# Patient Record
Sex: MALE | Race: WHITE | NOT HISPANIC OR LATINO | Employment: FULL TIME | URBAN - METROPOLITAN AREA
[De-identification: names, ages, dates, MRNs, and addresses within clinical notes are randomized per-mention and may not be internally consistent; named-entity substitution may affect disease eponyms.]

---

## 2022-01-24 ENCOUNTER — HOSPITAL ENCOUNTER (EMERGENCY)
Facility: HOSPITAL | Age: 37
Discharge: HOME | End: 2022-01-24
Admitting: EMERGENCY MEDICINE
Payer: COMMERCIAL

## 2022-01-24 VITALS
RESPIRATION RATE: 18 BRPM | DIASTOLIC BLOOD PRESSURE: 69 MMHG | WEIGHT: 225 LBS | BODY MASS INDEX: 27.98 KG/M2 | TEMPERATURE: 97.9 F | OXYGEN SATURATION: 99 % | HEART RATE: 67 BPM | SYSTOLIC BLOOD PRESSURE: 125 MMHG | HEIGHT: 75 IN

## 2022-01-24 DIAGNOSIS — S61.208A AVULSION OF SKIN OF INDEX FINGER, INITIAL ENCOUNTER: Primary | ICD-10-CM

## 2022-01-24 PROCEDURE — 99282 EMERGENCY DEPT VISIT SF MDM: CPT

## 2022-01-24 PROCEDURE — 99282 EMERGENCY DEPT VISIT SF MDM: CPT | Performed by: PHYSICIAN ASSISTANT

## 2022-01-24 NOTE — ED PROVIDER NOTES
History  Chief Complaint   Patient presents with    Finger Laceration     sliced left pointer finger with apple slicer  29 y o  male presents to the ED with chief complaint of finger laceration  Onset reported as just prior to arrival  Location is reported as left index finger  Quality is reported as laceration  Severity is reported as moderate  Associated symptoms:  Denies wrist, elbow or shoulder pain  Denies paralysis, paraesthesias or weakness to hand or upper extremity  Modifiers:  Local pressure applied to area has helped control bleeding  Hand dominance:  right  Context:  Patient reports he accidentally cut the tip of his left index finger with apple slicer at home just prior to arrival   Right hand dominant  Tetanus up to date  Denies other injuries  Works for [employer]  Medical summary - past visit history reviewed via EPIC demonstrates no prior visits to this ed  History provided by:  Patient   used: No    Finger Laceration  Associated symptoms: no fever and no rash        None       History reviewed  No pertinent past medical history  History reviewed  No pertinent surgical history  History reviewed  No pertinent family history  I have reviewed and agree with the history as documented  E-Cigarette/Vaping     E-Cigarette/Vaping Substances     Social History     Tobacco Use    Smoking status: Current Some Day Smoker    Smokeless tobacco: Not on file   Substance Use Topics    Alcohol use: Not on file    Drug use: Not on file       Review of Systems   Constitutional: Negative for fever  Musculoskeletal: Negative for arthralgias, back pain, gait problem, joint swelling, myalgias, neck pain and neck stiffness  Skin: Positive for wound  Negative for color change, pallor and rash  Neurological: Negative for dizziness, tremors, weakness, light-headedness and numbness  All other systems reviewed and are negative        Physical Exam  Physical Exam  Vitals and nursing note reviewed  Constitutional:       General: He is not in acute distress  Appearance: Normal appearance  He is not ill-appearing  Comments: /69 (BP Location: Right arm)   Pulse 67   Temp 97 9 °F (36 6 °C) (Oral)   Resp 18   Ht 6' 3" (1 905 m)   Wt 102 kg (225 lb)   SpO2 99%   BMI 28 12 kg/m²      HENT:      Head: Normocephalic and atraumatic  Right Ear: External ear normal       Left Ear: External ear normal       Nose: Nose normal    Eyes:      General: No scleral icterus  Right eye: No discharge  Left eye: No discharge  Extraocular Movements: Extraocular movements intact  Conjunctiva/sclera: Conjunctivae normal    Cardiovascular:      Rate and Rhythm: Normal rate  Pulses: Normal pulses  Pulmonary:      Effort: Pulmonary effort is normal  No respiratory distress  Musculoskeletal:         General: Tenderness and signs of injury present  No swelling or deformity  Normal range of motion  Cervical back: Normal range of motion and neck supple  No rigidity or tenderness  Comments: Left index finger:  No gross deformity,  Distal neurovascular intact to left index finger  Flex/ext tendon function fully intact  Strength 5/5 normal   FROM at left index MCPJ, PIPJ and DIPJ  There is superficial skin avulsion present to distal tip of index finger with venous bleeding present  Superficial skin avulsion - explored to depth, no FB, no injury to underlying structures  Fingernail intact without avulsion or subungual hematoma  Remainder of hand is normal to inspection, non tender to palpation with FROM  Skin:     Capillary Refill: Capillary refill takes less than 2 seconds  Coloration: Skin is not jaundiced or pale  Findings: No erythema or rash  Neurological:      General: No focal deficit present  Mental Status: He is alert and oriented to person, place, and time  Mental status is at baseline  Cranial Nerves:  No cranial nerve deficit  Sensory: No sensory deficit  Motor: No weakness  Gait: Gait normal    Psychiatric:         Mood and Affect: Mood normal          Behavior: Behavior normal          Thought Content: Thought content normal          Judgment: Judgment normal          Vital Signs  ED Triage Vitals [01/24/22 0908]   Temperature Pulse Respirations Blood Pressure SpO2   97 9 °F (36 6 °C) 67 18 125/69 99 %      Temp Source Heart Rate Source Patient Position - Orthostatic VS BP Location FiO2 (%)   Oral Monitor -- Right arm --      Pain Score       5           Vitals:    01/24/22 0908   BP: 125/69   Pulse: 67         Visual Acuity      ED Medications  Medications - No data to display    Diagnostic Studies  Results Reviewed     None                 No orders to display              Procedures  Laceration repair    Date/Time: 1/24/2022 9:20 AM  Performed by: Jodie Haddad PA-C  Authorized by: Jodie Haddad PA-C   Consent: Verbal consent obtained  Risks and benefits: risks, benefits and alternatives were discussed  Consent given by: patient  Patient identity confirmed: verbally with patient  Body area: upper extremity  Location details: left index finger  Laceration length: 1 cm  Foreign bodies: no foreign bodies  Tendon involvement: none  Nerve involvement: none  Vascular damage: no  Anesthesia: see MAR for details    Sedation:  Patient sedated: no      Wound Dehiscence:  Superficial Wound Dehiscence: simple closure      Procedure Details:  Irrigation solution: saline  Irrigation method: tap  Amount of cleaning: standard  Debridement: none  Degree of undermining: none  Approximation difficulty: simple  Dressing: tube gauze (gelfoam dressing)  Patient tolerance: patient tolerated the procedure well with no immediate complications  Comments: Patient with gelfoam dressing to left index finger covered with tube gauze  Bleeding controlled  Wound care/dressing application performed by me  ED Course                               SBIRT 22yo+      Most Recent Value   SBIRT (24 yo +)    In order to provide better care to our patients, we are screening all of our patients for alcohol and drug use  Would it be okay to ask you these screening questions? Yes Filed at: 01/24/2022 9572   Initial Alcohol Screen: US AUDIT-C     1  How often do you have a drink containing alcohol? 0 Filed at: 01/24/2022 0919   2  How many drinks containing alcohol do you have on a typical day you are drinking? 0 Filed at: 01/24/2022 0919   3a  Male UNDER 65: How often do you have five or more drinks on one occasion? 0 Filed at: 01/24/2022 0919   Audit-C Score 0 Filed at: 01/24/2022 1859   OFELIA: How many times in the past year have you    Used an illegal drug or used a prescription medication for non-medical reasons? Never Filed at: 01/24/2022 6762                    MDM  Number of Diagnoses or Management Options  Avulsion of skin of index finger, initial encounter: new and does not require workup  Diagnosis management comments: ddx includes but is not limited to :  Skin avulsion, finger laceration, infection, nail injury, injury to underlying vascular, ligamentous, tendinous or orthopedic structures  Amount and/or Complexity of Data Reviewed  Review and summarize past medical records: yes    Risk of Complications, Morbidity, and/or Mortality  General comments: ED summary:  45-year-old male accidentally cut the distal tip of his left index finger with an apple states root home prior to arrival   Patient is right-hand dominant  He works for the [place] [id number] [email or web address]   He denies other injuries  No neurological deficits on clinical exam   Patient is neurovascularly intact to the left index finger  There is no finger nail injury  There is a superficial skin avulsion noted to the distal tip of the finger  Bleeding is controlled with local pressure    Gelfoam dressing was applied and covered with tube gauze with control of bleeding and treatment of skin avulsion  Discussed diagnosis and expected course of skin avulsion  Discussed treatment plan including change in no powder dressing daily for the next 2 weeks while leaving Gelfoam dressing in place  No clinical signs of infection  No indication for antibiotics  Tetanus is up-to-date  Wound is to superficial to suture hence no sutures are indicated  Wound is too superficial for injury to underlying structures  Instructed patient regarding follow up with primary care physician hand specialist in 3-5 days recheck and further evaluation of symptoms       Patient was seen during the outbreak of the corona virus epidemic   Resources are limited due to the severity of patient illnesses associated with virus   Testing is also limited at this time   Discussed with patient at the time of this evaluation   Due to the fact that limited resources are available -treatment options are limited  Patient Progress  Patient progress: stable      Disposition  Final diagnoses:   Avulsion of skin of index finger, initial encounter     Time reflects when diagnosis was documented in both MDM as applicable and the Disposition within this note     Time User Action Codes Description Comment    1/24/2022  9:31 AM Nyla Reasons Add [V61 [id number]] Avulsion of skin of index finger, initial encounter       ED Disposition     ED Disposition Condition Date/Time Comment    Discharge Stable Mon Jan 24, 2022  9:31 AM Malcom Cho discharge to home/self care              Follow-up Information     Follow up With Specialties Details Why Contact Info Additional [address] [facility] Emergency Department Emergency Medicine Go to  If symptoms worsen [address] [facility] [id number] [facility] Emergency Department, [address] [place], [place], [address] Paulina, MD Family Medicine Call in 2 days for further evaluation of symptoms [address]   [address]  [address]       Elias Chavez MD Orthopedic Surgery, Hand Surgery Call  follow up as needed [address]  [phone]             There are no discharge medications for this patient  No discharge procedures on file      PDMP Review     None          ED Provider  Electronically Signed by           Josue Wong PA-C  01/26/22 9640

## 2022-01-24 NOTE — Clinical Note
Maria Fernanda Jorgensen was seen and treated in our emergency department on 1/24/2022  light duty, no lifting greater than 5lbs, limited use of left hand until 1/31/22    Diagnosis:     Toma Arzola  may return to work on return date  He may return on this date: 01/26/2022         If you have any questions or concerns, please don't hesitate to call        Christi Laurent PA-C    ______________________________           _______________          _______________  Hospital Representative                              Date                                Time

## 2022-01-27 ENCOUNTER — HOSPITAL ENCOUNTER (EMERGENCY)
Facility: HOSPITAL | Age: 37
Discharge: HOME | End: 2022-01-27
Attending: EMERGENCY MEDICINE | Admitting: EMERGENCY MEDICINE
Payer: COMMERCIAL

## 2022-01-27 ENCOUNTER — APPOINTMENT (EMERGENCY)
Dept: RADIOLOGY | Facility: HOSPITAL | Age: 37
End: 2022-01-27
Payer: COMMERCIAL

## 2022-01-27 VITALS
HEART RATE: 80 BPM | BODY MASS INDEX: 28.12 KG/M2 | OXYGEN SATURATION: 98 % | DIASTOLIC BLOOD PRESSURE: 65 MMHG | RESPIRATION RATE: 18 BRPM | WEIGHT: 225 LBS | TEMPERATURE: 97.7 F | SYSTOLIC BLOOD PRESSURE: 143 MMHG

## 2022-01-27 DIAGNOSIS — M53.3 COCCYX PAIN: ICD-10-CM

## 2022-01-27 DIAGNOSIS — S93.409A ANKLE SPRAIN: Primary | ICD-10-CM

## 2022-01-27 PROCEDURE — 99284 EMERGENCY DEPT VISIT MOD MDM: CPT | Performed by: EMERGENCY MEDICINE

## 2022-01-27 PROCEDURE — 99283 EMERGENCY DEPT VISIT LOW MDM: CPT

## 2022-01-27 PROCEDURE — 73610 X-RAY EXAM OF ANKLE: CPT

## 2022-01-27 RX ORDER — IBUPROFEN 600 MG/1
600 TABLET ORAL ONCE
Status: COMPLETED | OUTPATIENT
Start: 2022-01-27 | End: 2022-01-27

## 2022-01-27 RX ORDER — ACETAMINOPHEN 325 MG/1
650 TABLET ORAL ONCE
Status: DISCONTINUED | OUTPATIENT
Start: 2022-01-27 | End: 2022-01-27 | Stop reason: HOSPADM

## 2022-01-27 RX ADMIN — IBUPROFEN 600 MG: 600 TABLET ORAL at 12:15

## 2022-01-27 NOTE — Clinical Note
Joanna Bird was seen and treated in our emergency department on 1/27/2022  Diagnosis:     Xavier Victoria  may return to work on return date  He may return on this date: 02/01/2022         If you have any questions or concerns, please don't hesitate to call        Estephanie Figueroa,     ______________________________           _______________          _______________  Hospital Representative                              Date                                Time

## 2022-01-27 NOTE — ED NOTES
Pt is discharged to home at this time  VSS  AVS given and pt expressed understanding  Pt given Boot and educated on use         Kelsey Bishop, JOSEPH  01/27/22 3566

## 2022-01-27 NOTE — ED PROVIDER NOTES
History  Chief Complaint   Patient presents with    Ankle Injury     R ankle pain after after trying to stop himself while sledding down a hill, also reports tailbone pain     Tailbone Pain     Patient is a 60-year-old male no past medical history presenting with right ankle and tailbone injury  Patient states that he was sledding yesterday when he felt that he was picking up to much [place] as approaching a ramp in his yd when he stuck his right foot out to low himself down and states that when he hit the rim he hit his but on the ramp  Notes intermittent pain which he cannot describe to the tailbone which is nonradiating and worse with movement, worse with sitting and not resolved with Tylenol which he took yesterday  Also notes throbbing pain to the lateral aspect of the right ankle which radiates up his leg and is worse with walking or weight-bearing also not resolved with Tylenol yesterday  Denies any numbness or tingling, head trauma, LOC  None       History reviewed  No pertinent past medical history  History reviewed  No pertinent surgical history  History reviewed  No pertinent family history  I have reviewed and agree with the history as documented  E-Cigarette/Vaping     E-Cigarette/Vaping Substances     Social History     Tobacco Use    Smoking status: Current Some Day Smoker    Smokeless tobacco: Never Used   Substance Use Topics    Alcohol use: Not Currently    Drug use: Not Currently       Review of Systems   All other systems reviewed and are negative  Physical Exam  Physical Exam  Vitals reviewed  Constitutional:       General: He is not in acute distress  Appearance: Normal appearance  He is not ill-appearing  HENT:      Mouth/Throat:      Mouth: Mucous membranes are moist    Eyes:      Conjunctiva/sclera: Conjunctivae normal    Cardiovascular:      Rate and Rhythm: Normal rate     Pulmonary:      Effort: Pulmonary effort is normal    Abdominal:      General: Abdomen is flat  Musculoskeletal:         General: Tenderness present  No swelling  Normal range of motion  Cervical back: Neck supple  Comments: Tenderness proximal to the lateral malleolus, mild medial malleolar tenderness, no tenderness to the navicular   Skin:     General: Skin is warm and dry  Capillary Refill: Capillary refill takes less than 2 seconds  Neurological:      General: No focal deficit present  Mental Status: He is alert  Sensory: No sensory deficit  Motor: No weakness  Psychiatric:         Mood and Affect: Mood normal          Vital Signs  ED Triage Vitals [01/27/22 1114]   Temperature Pulse Respirations Blood Pressure SpO2   97 7 °F (36 5 °C) 80 18 143/65 98 %      Temp Source Heart Rate Source Patient Position - Orthostatic VS BP Location FiO2 (%)   Temporal Monitor Sitting Left arm --      Pain Score       --           Vitals:    01/27/22 1114   BP: 143/65   Pulse: 80   Patient Position - Orthostatic VS: Sitting         Visual Acuity      ED Medications  Medications   acetaminophen (TYLENOL) tablet 650 mg (650 mg Oral Not Given 1/27/22 1215)   ibuprofen (MOTRIN) tablet 600 mg (600 mg Oral Given 1/27/22 1215)       Diagnostic Studies  Results Reviewed     None                 XR ankle 3+ views RIGHT   Final Result by Elizabeth Krishnamurthy MD (01/27 1230)      No acute osseous abnormality  Workstation performed: [id number]                    Procedures  Procedures         ED Course                               SBIRT 22yo+      Most Recent Value   SBIRT (22 yo +)    In order to provide better care to our patients, we are screening all of our patients for alcohol and drug use  Would it be okay to ask you these screening questions? Yes Filed at: 01/27/2022 1117   Initial Alcohol Screen: US AUDIT-C     1  How often do you have a drink containing alcohol? 0 Filed at: 01/27/2022 1117   2   How many drinks containing alcohol do you have on a typical day you are drinking? 0 Filed at: 01/27/2022 1117   3a  Male UNDER 65: How often do you have five or more drinks on one occasion? 0 Filed at: 01/27/2022 1117   3b  FEMALE Any Age, or MALE 65+: How often do you have 4 or more drinks on one occassion? 0 Filed at: 01/27/2022 1117   Audit-C Score 0 Filed at: 01/27/2022 1117   OFELIA: How many times in the past year have you    Used an illegal drug or used a prescription medication for non-medical reasons? Never Filed at: 01/27/2022 1117                    [facility]  Number of Diagnoses or Management Options  Ankle sprain  Coccyx pain  Diagnosis management comments: Patient is a 50-year-old male no past medical history presenting with ankle injury, tailbone injury  Patient is well-appearing bedside stable vitals and in no acute distress  He is neurovascularly intact and ambulatory with mildly antalgic gait  He has no significant edema but does have tenderness to the lateral malleolus as well as proximally into the medial malleolus  X-ray unremarkable  Will place in boot with crutches and outpatient Orthopedic and Podiatry follow-up  Have discussed with patient that sacral x-ray is likely not necessary as it does not , pain control, below an orthopedic follow-up  Patient states that he is comfortable with outpatient follow-up for this injury  Disposition  Final diagnoses: Ankle sprain   Coccyx pain     Time reflects when diagnosis was documented in both MDM as applicable and the Disposition within this note     Time User Action Codes Description Comment    1/27/2022 12:01 PM Sofia Tapia Add [S48 774K] Ankle sprain     1/27/2022 12:01 PM Sofia Duong [M53 3] Coccyx pain       ED Disposition     ED Disposition Condition Date/Time Comment    Discharge Stable u Jan 27, 2022 12:01 PM Tameka Nurse discharge to home/self care              Follow-up Information     Follow up With Specialties Details Why Contact Info Additional Information    [facility] Orthopedic Surgery Schedule an appointment as soon as possible for a visit   [address] [facility]  [address] [phone] [address] [facility] [address], [address] [address] [place], [place], [phone]    [facility] Schedule an appointment as soon as possible for a visit   [address] [facility] [address]             There are no discharge medications for this patient  No discharge procedures on file      PDMP Review     None          ED Provider  Electronically Signed by           [employer], DO  01/27/22 0423